# Patient Record
Sex: MALE | Race: OTHER | Employment: UNEMPLOYED | URBAN - METROPOLITAN AREA
[De-identification: names, ages, dates, MRNs, and addresses within clinical notes are randomized per-mention and may not be internally consistent; named-entity substitution may affect disease eponyms.]

---

## 2021-11-19 ENCOUNTER — APPOINTMENT (OUTPATIENT)
Dept: GENERAL RADIOLOGY | Facility: HOSPITAL | Age: 71
End: 2021-11-19

## 2021-11-19 ENCOUNTER — HOSPITAL ENCOUNTER (EMERGENCY)
Facility: HOSPITAL | Age: 71
Discharge: HOME | End: 2021-11-19
Attending: EMERGENCY MEDICINE

## 2021-11-19 VITALS
SYSTOLIC BLOOD PRESSURE: 141 MMHG | WEIGHT: 150 LBS | BODY MASS INDEX: 28.32 KG/M2 | RESPIRATION RATE: 17 BRPM | OXYGEN SATURATION: 100 % | HEIGHT: 61 IN | HEART RATE: 92 BPM | DIASTOLIC BLOOD PRESSURE: 79 MMHG | TEMPERATURE: 98 F

## 2021-11-19 DIAGNOSIS — R03.0 ELEVATED BLOOD PRESSURE READING: ICD-10-CM

## 2021-11-19 DIAGNOSIS — M17.11 OSTEOARTHRITIS OF RIGHT KNEE, UNSPECIFIED OSTEOARTHRITIS TYPE: Primary | ICD-10-CM

## 2021-11-19 PROCEDURE — 99283 EMERGENCY DEPT VISIT LOW MDM: CPT

## 2021-11-19 PROCEDURE — 73560 X-RAY EXAM OF KNEE 1 OR 2: CPT | Performed by: EMERGENCY MEDICINE

## 2021-11-19 NOTE — CM/SW NOTE
Contacted [facility] , they are not accepting referrals until Monday morning. [facility] in [facility]. They are not a shelter anymore.     [facility] not have any bed availabili

## 2021-11-19 NOTE — CM/SW NOTE
Contacted [facility], their temporary housing if full. Contacted [facility] to inquire on warming centers. They will call me back if the center is open and accepting.

## 2021-11-19 NOTE — ED QUICK NOTES
medicar here ot take patient. Discharge instuctions given. Paper work given to [employer].  Going to [facility] leisure center

## 2021-11-19 NOTE — ED QUICK NOTES
Patient sitting on chair, sleeping. Appears in no acute distress. Awaiting medicar arrival to take to [facility].

## 2021-11-19 NOTE — ED INITIAL ASSESSMENT (HPI)
Alberta Mahoney arrives via [address] --patient was limping around so medics brought him to ED. Patient is homeless, appears disheveled.  Complains of right knee pain, denies injury   used # [id number]     Patient denies taking any daily meds, den

## 2021-11-19 NOTE — CM/SW NOTE
[facility] is open for a person to get warm in their lobby. The leisure center in [place] is open for people that need to get warm.